# Patient Record
Sex: FEMALE | Race: WHITE | URBAN - METROPOLITAN AREA
[De-identification: names, ages, dates, MRNs, and addresses within clinical notes are randomized per-mention and may not be internally consistent; named-entity substitution may affect disease eponyms.]

---

## 2023-03-21 ENCOUNTER — TELEPHONE (OUTPATIENT)
Dept: OBGYN CLINIC | Facility: CLINIC | Age: 33
End: 2023-03-21

## 2023-03-21 NOTE — TELEPHONE ENCOUNTER
LMP 2/8, periods every 28-31 days (5w6d). Ht: 5'6\", Wt:140#. Pt agreed to see all providers. Pt given recs for OBN appt but pt asked if the first appt should be to verify pregnancy. Pt advised that we feel the hpts are accurate. Pt states she would like an appt to verify pregnancy. Pt informed first available NP appt is on 4/17. Pt unhappy that the appt is so far out. Pt asked if she should just go to a clinic for a test.  Pt informed she can if she wants. Pt informed that we will order a pregnancy test with her npn labs. Pt then agreed to schedule obn appt. Pt offered appt on 4/10, when pt is 8w5d. Pt again was unhappy that this appt was so far away. PT states she will already be 2 months pregnant and she feels like she should be seen sooner. Pt informed that prior to 8-9 weeks it is harder to see on US and hear FHT so we do not bring pt's in sooner than that. Pt informed there is not much to do before 8 weeks. Pt asked if this is the policy everywhere or just our office. Pt informed I am unsure what other offices do. Pt given phone number to [facility]. Pt states she will call back is she wants to continue care with us.

## 2023-03-22 ENCOUNTER — TELEPHONE (OUTPATIENT)
Dept: OBGYN CLINIC | Facility: CLINIC | Age: 33
End: 2023-03-22

## 2023-03-22 NOTE — TELEPHONE ENCOUNTER
Pt reports pump inside vagina that is bigger than pimple. Only painful if she touches it. Denies drainage. Pt has not been seen before. Informed pt we do not have any available appts this week or next week. Informed pt she should be seen and can call her PCP or go to . Pt then states she is pregnant and wants to schedule an appt. Informed pt next OBn is on 4/13. (see 3/21/23 TE). Pt states she will call a different group.

## 2023-03-22 NOTE — TELEPHONE ENCOUNTER
Patient is 8 weeks pregnant, first day of Last period is 2/8/23. Patient is wanting to come in as soon as possible due to a cyst she noticed in Vagina.

## 2024-10-27 ENCOUNTER — HOSPITAL ENCOUNTER (EMERGENCY)
Facility: HOSPITAL | Age: 34
Discharge: HOME | End: 2024-10-27
Payer: COMMERCIAL

## 2024-10-27 ENCOUNTER — APPOINTMENT (OUTPATIENT)
Dept: ULTRASOUND IMAGING | Facility: HOSPITAL | Age: 34
End: 2024-10-27
Attending: NURSE PRACTITIONER
Payer: COMMERCIAL

## 2024-10-27 ENCOUNTER — APPOINTMENT (OUTPATIENT)
Dept: CT IMAGING | Facility: HOSPITAL | Age: 34
End: 2024-10-27
Attending: NURSE PRACTITIONER
Payer: COMMERCIAL

## 2024-10-27 ENCOUNTER — HOSPITAL ENCOUNTER (EMERGENCY)
Age: 34
Discharge: LEFT BEFORE DISCHARGE | End: 2024-10-27
Attending: STUDENT IN AN ORGANIZED HEALTH CARE EDUCATION/TRAINING PROGRAM

## 2024-10-27 VITALS
RESPIRATION RATE: 18 BRPM | BODY MASS INDEX: 22.5 KG/M2 | DIASTOLIC BLOOD PRESSURE: 74 MMHG | WEIGHT: 140 LBS | TEMPERATURE: 98 F | SYSTOLIC BLOOD PRESSURE: 112 MMHG | OXYGEN SATURATION: 99 % | HEIGHT: 66 IN | HEART RATE: 78 BPM

## 2024-10-27 VITALS
TEMPERATURE: 97.9 F | SYSTOLIC BLOOD PRESSURE: 121 MMHG | OXYGEN SATURATION: 97 % | HEART RATE: 106 BPM | RESPIRATION RATE: 19 BRPM | DIASTOLIC BLOOD PRESSURE: 78 MMHG

## 2024-10-27 DIAGNOSIS — R10.9 ABDOMINAL PAIN OF UNKNOWN ETIOLOGY: ICD-10-CM

## 2024-10-27 DIAGNOSIS — R10.2 PELVIC PAIN: Primary | ICD-10-CM

## 2024-10-27 LAB
ANION GAP SERPL CALC-SCNC: 7 MMOL/L (ref 0–18)
B-HCG UR QL: NEGATIVE
BASOPHILS # BLD AUTO: 0.05 X10(3) UL (ref 0–0.2)
BASOPHILS NFR BLD AUTO: 0.6 %
BILIRUB UR QL: NEGATIVE
BUN BLD-MCNC: 15 MG/DL (ref 9–23)
BUN/CREAT SERPL: 15.2 (ref 10–20)
CALCIUM BLD-MCNC: 9.5 MG/DL (ref 8.7–10.4)
CHLORIDE SERPL-SCNC: 106 MMOL/L (ref 98–112)
CLARITY UR: CLEAR
CO2 SERPL-SCNC: 28 MMOL/L (ref 21–32)
CREAT BLD-MCNC: 0.99 MG/DL
DEPRECATED RDW RBC AUTO: 42.1 FL (ref 35.1–46.3)
EGFRCR SERPLBLD CKD-EPI 2021: 78 ML/MIN/1.73M2 (ref 60–?)
EOSINOPHIL # BLD AUTO: 0.15 X10(3) UL (ref 0–0.7)
EOSINOPHIL NFR BLD AUTO: 1.8 %
ERYTHROCYTE [DISTWIDTH] IN BLOOD BY AUTOMATED COUNT: 13.2 % (ref 11–15)
GLUCOSE BLD-MCNC: 97 MG/DL (ref 70–99)
GLUCOSE UR-MCNC: NORMAL MG/DL
HCT VFR BLD AUTO: 39.5 %
HGB BLD-MCNC: 12.9 G/DL
HGB UR QL STRIP.AUTO: NEGATIVE
IMM GRANULOCYTES # BLD AUTO: 0.02 X10(3) UL (ref 0–1)
IMM GRANULOCYTES NFR BLD: 0.2 %
KETONES UR-MCNC: NEGATIVE MG/DL
LEUKOCYTE ESTERASE UR QL STRIP.AUTO: NEGATIVE
LYMPHOCYTES # BLD AUTO: 1.19 X10(3) UL (ref 1–4)
LYMPHOCYTES NFR BLD AUTO: 14.6 %
MCH RBC QN AUTO: 28.4 PG (ref 26–34)
MCHC RBC AUTO-ENTMCNC: 32.7 G/DL (ref 31–37)
MCV RBC AUTO: 87 FL
MONOCYTES # BLD AUTO: 0.44 X10(3) UL (ref 0.1–1)
MONOCYTES NFR BLD AUTO: 5.4 %
NEUTROPHILS # BLD AUTO: 6.31 X10 (3) UL (ref 1.5–7.7)
NEUTROPHILS # BLD AUTO: 6.31 X10(3) UL (ref 1.5–7.7)
NEUTROPHILS NFR BLD AUTO: 77.4 %
NITRITE UR QL STRIP.AUTO: NEGATIVE
OSMOLALITY SERPL CALC.SUM OF ELEC: 293 MOSM/KG (ref 275–295)
PH UR: 6.5 [PH] (ref 5–8)
PLATELET # BLD AUTO: 275 10(3)UL (ref 150–450)
POTASSIUM SERPL-SCNC: 4.1 MMOL/L (ref 3.5–5.1)
PROT UR-MCNC: NEGATIVE MG/DL
RBC # BLD AUTO: 4.54 X10(6)UL
SODIUM SERPL-SCNC: 141 MMOL/L (ref 136–145)
SP GR UR STRIP: 1.01 (ref 1–1.03)
UROBILINOGEN UR STRIP-ACNC: NORMAL
WBC # BLD AUTO: 8.2 X10(3) UL (ref 4–11)

## 2024-10-27 PROCEDURE — 99284 EMERGENCY DEPT VISIT MOD MDM: CPT

## 2024-10-27 PROCEDURE — 80048 BASIC METABOLIC PNL TOTAL CA: CPT

## 2024-10-27 PROCEDURE — 85025 COMPLETE CBC W/AUTO DIFF WBC: CPT

## 2024-10-27 PROCEDURE — 74177 CT ABD & PELVIS W/CONTRAST: CPT | Performed by: NURSE PRACTITIONER

## 2024-10-27 PROCEDURE — 76830 TRANSVAGINAL US NON-OB: CPT | Performed by: NURSE PRACTITIONER

## 2024-10-27 PROCEDURE — 81025 URINE PREGNANCY TEST: CPT

## 2024-10-27 PROCEDURE — 76856 US EXAM PELVIC COMPLETE: CPT | Performed by: NURSE PRACTITIONER

## 2024-10-27 PROCEDURE — 81003 URINALYSIS AUTO W/O SCOPE: CPT

## 2024-10-27 PROCEDURE — 36415 COLL VENOUS BLD VENIPUNCTURE: CPT

## 2024-10-27 NOTE — ED INITIAL ASSESSMENT (HPI)
S: pt with rlq for a month, today it became worse prompting ER visit. Denies fevers, vomiting or diarrhea.

## 2024-10-27 NOTE — ED PROVIDER NOTES
Patient Seen in: [facility] Emergency Department      History     Chief Complaint   Patient presents with    Abdomen/Flank Pain     Stated Complaint: Abd pain,Nausea    Subjective:   34-year-old female with no significant past medical history presenting with complaints of pain to the right lower abdomen/pelvic region for the past month.  Pain is described as a stabbing pain which has been constant since the onset with episodic reservations.  She denies any known triggering events.  She denies any fevers, chills, nausea, vomiting, diarrhea, urinary symptoms, rashes, vaginal bleeding, or further associated symptoms.  She was recently treated for H. pylori 2 months ago with antibiotics and again for lung infection with antibiotics.  Of note, she has been under increased stress as her  was recently diagnosed with lymphoma 1 week after she delivered her baby 11 months ago.              Objective:     History reviewed. No pertinent past medical history.           History reviewed. No pertinent surgical history.             Social History     Socioeconomic History    Marital status:    Tobacco Use    Smoking status: Never    Smokeless tobacco: Never   Vaping Use    Vaping status: Never Used   Substance and Sexual Activity    Alcohol use: Never    Drug use: Never     Social Drivers of Health     Financial Resource Strain: Low Risk  (11/21/2023)    Received from [facility]    Overall Financial Resource Strain (CARDIA)     Difficulty of Paying Living Expenses: Not very hard   Food Insecurity: No Food Insecurity (11/21/2023)    Received from [facility]    Hunger Vital Sign     Worried About Running Out of Food in the Last Year: Never true     Ran Out of Food in the Last Year: Never true   Transportation Needs: No Transportation Needs (11/21/2023)    Received from [facility]    PRAPARE - Transportation     Lack of Transportation (Medical): No      Lack of Transportation (Non-Medical): No   Physical Activity: Sufficiently Active (11/21/2023)    Received from [facility]    Exercise Vital Sign     Days of Exercise per Week: 5 days     Minutes of Exercise per Session: 60 min   Stress: Patient Declined (11/21/2023)    Received from [facility]    [employer] [place] [employer] - Occupational Stress Questionnaire     Feeling of Stress : Patient declined   Social Connections: Moderately Isolated (11/21/2023)    Received from [facility]    Social Connection and Isolation Panel [NHANES]     Frequency of Communication with Friends and Family: More than three times a week     Frequency of Social Gatherings with Friends and Family: Once a week     Attends Taoism Services: Never     Active Member of Clubs or Organizations: No     Attends Club or Organization Meetings: Never     Marital Status:    Housing Stability: Low Risk  (11/21/2023)    Received from [facility]    Housing Stability Vital Sign     Unable to Pay for Housing in the Last Year: No     Number of Places Lived in the Last Year: 1     Unstable Housing in the Last Year: No                  Physical Exam     ED Triage Vitals   BP 10/27/24 1509 116/72   Pulse 10/27/24 1509 91   Resp 10/27/24 1509 20   Temp 10/27/24 1509 98.2 °F (36.8 °C)   Temp src 10/27/24 1509 Oral   SpO2 10/27/24 1509 99 %   O2 Device 10/27/24 1958 None (Room air)       Current Vitals:   Vital Signs  BP: 112/74  Pulse: 78  Resp: 18  Temp: 98.2 °F (36.8 °C)  Temp src: Oral  MAP (mmHg): 88    Oxygen Therapy  SpO2: 99 %  O2 Device: None (Room air)        Physical Exam  Vitals and nursing note reviewed.   Constitutional:       Appearance: Normal appearance.   HENT:      Head: Normocephalic.      Right Ear: External ear normal.      Left Ear: External ear normal.      Nose: Nose normal.      Mouth/Throat:      Mouth: Mucous membranes are moist.    Eyes:      Extraocular Movements: Extraocular movements intact.      Conjunctiva/sclera: Conjunctivae normal.      Pupils: Pupils are equal, round, and reactive to light.   Cardiovascular:      Rate and Rhythm: Normal rate and regular rhythm.   Pulmonary:      Effort: Pulmonary effort is normal.      Breath sounds: Normal breath sounds.   Abdominal:      Palpations: Abdomen is soft.      Tenderness: There is abdominal tenderness (Right pelvic region without rebound or guarding). There is no right CVA tenderness, left CVA tenderness, guarding or rebound.      Hernia: No hernia is present.   Musculoskeletal:         General: Normal range of motion.      Cervical back: Normal range of motion.   Skin:     General: Skin is warm and dry.   Neurological:      Mental Status: She is alert and oriented to person, place, and time.   Psychiatric:         Mood and Affect: Mood normal.         Behavior: Behavior normal.             ED Course     Labs Reviewed   BASIC METABOLIC PANEL (8) - Normal   POCT PREGNANCY URINE - Normal   URINALYSIS WITH CULTURE REFLEX   CBC WITH DIFFERENTIAL WITH PLATELET   RAINBOW DRAW LAVENDER   RAINBOW DRAW LIGHT GREEN   RAINBOW DRAW BLUE   RAINBOW DRAW GOLD     CT ABDOMEN+PELVIS(CONTRAST ONLY)(CPT=74177)    Result Date: 10/27/2024  PROCEDURE: CT ABDOMEN + PELVIS (CONTRAST ONLY) (CPT=74177)  COMPARISON: [facility],  PELVIS TRANSABDOMINAL AND TRANSVAGINAL (CPT=76856/46201), 10/27/2024, 5:05 PM.  INDICATIONS: Right lower quadrant abdominal pain with nausea.  TECHNIQUE: Multidetector CT images of the abdomen and pelvis were obtained with non-ionic intravenous contrast material. Automated exposure control for dose reduction was used. Adjustment of the mA and/or kV was done based on the patient's size. Iterative reconstruction technique for dose reduction was employed. Dose information was transmitted to the ACR (American College of Radiology) NRDR (National Radiology Data Registry),  which includes the Dose Index Registry. Oral contrast was not ingested.  FINDINGS: LUNG BASES: The heart is normal in size. There is granulomatous calcification of the posteromedial left lower lobe. There is dependent subsegmental atelectasis bilaterally. LIVER: The liver is enlarged, measuring 19.4 cm. A subcentimeter hypoattenuating hepatic lesion is identified, too small fully characterize.  BILIARY: The gallbladder is present. PANCREAS: No lesion, fluid collection, ductal dilatation, or atrophy.  SPLEEN: No enlargement. A small splenule is seen in the left upper quadrant.  ADRENALS:   No defined mass or abnormal enlargement.  KIDNEYS:   Symmetric enhancement is seen without evidence of hydronephrosis or underlying solid masses. GI/MESENTERY:  There is no evidence of bowel obstruction. A normal caliber appendix is seen without inflammatory manifestations. A heavy stool burden is demonstrated throughout the majority of the colon.   URINARY BLADDER: Incompletely distended without visible calculus. Mild circumferential bladder wall thickening may relate to underdistention. PELVIC NODES: No lymphadenopathy.   PELVIC ORGANS: The uterus is present. The posterior aspect of the fundus, there is a subserosal fibroid. Deep pelvic calcifications likely represent phleboliths.  VASCULATURE:   No aneurysm is detected. RETROPERITONEUM: No mass or lymphadenopathy is apparent.  BONES:   Partial transitional lumbosacral anatomy is noted with lumbarization of the S1 vertebral body. Well-formed pseudoarthroses are present between the transverse processes of the transitional segment and the sacral ala bilaterally. A rudimentary disc is appreciated between the transitional body and the remainder of the sacral spine. Minimal multilevel degenerative the spine are appreciated. ABDOMINAL WALL: Nonspecific periumbilical ovoid nodularity is perceived. OTHER: No free air or fluid is seen in the abdomen or pelvis.           CONCLUSION:  1. No  acute intra-abdominal process is identified. The etiology of the patient's symptoms is unclear from this study.  2. Mild hepatomegaly.  3. Small subserosal uterine fibroid.  4. Lesser incidental findings as above.    Dictated by (CST): Dilip Ramos MD on 10/27/2024 at 8:07 PM     Finalized by (CST): Dilip Ramos MD on 10/27/2024 at 8:11 PM          US PELVIS (TRANSABDOMINAL AND TRANSVAGINAL) (CPT=76856/12344)    Result Date: 10/27/2024  PROCEDURE: US PELVIS TRANSABDOMINAL AND TRANSVAGINAL (CPT=76856/06641)  COMPARISON: None available.  INDICATIONS: Generalized abdominal pain with nausea.  TECHNIQUE: Pelvic ultrasound using transabdominal and transvaginal technique.  A transvaginal scan was performed for improved tissue characterization of the uterus, enhanced visualization of the endometrial canal, and ovarian/adnexal evaluation.  FINDINGS:   UTERUS:    SIZE:  The uterus measures 8.9 x 4.9 x 5.5 cm. ENDOMETRIUM: Endometrial thickness measures up to 1.1 cm. No fluid or mass in the endometrial canal.  MYOMETRIUM: Somewhat heterogeneous echogenicity is seen. At the posterior uterine body, there is an exophytic subserosal probable fibroid measuring 1.5 x 1.6 x 1.5 cm.  OVARIES AND ADNEXA:  RIGHT:   The right ovary measures 3.1 x 2.6 x 2.0 cm and has a sonographically unremarkable appearance. LEFT:   The left ovary measures 3.4 x 1.8 x 2.0 cm and demonstrates a grossly unremarkable sonographic appearance.  CUL-DE-SAC:   No free fluid or mass.  OTHER: Limited sonographic views of the bladder are grossly unremarkable.          CONCLUSION:  1. Sonographically unremarkable appearance of the ovaries.  2. A subserosal uterine fibroid is noted.  3. Lesser incidental findings as above.    Dictated by (CST): Dilip Ramos MD on 10/27/2024 at 6:04 PM     Finalized by (CST): Dilip Ramos MD on 10/27/2024 at 6:06 PM         CT of the abdomen pelvis was obtained and without any acute findings.              MDM               Medical Decision Making  Differentials include ovarian cysts versus IBS/IBD versus strain versus other.  Labs overall reassuring.  Pelvic ultrasound was obtained and without acute findings.  We did discuss obtaining further imaging in ED as etiology of pain not entirely clear.  CT of the abdomen pelvis obtained as well and without any acute findings.  At this time, etiology of her symptoms is not entirely clear.  Advised to follow-up with her OB/GYN or primary care doctor for further evaluation and management.  Patient and her family verbalized understanding and agreement plan.    Amount and/or Complexity of Data Reviewed  Labs: ordered. Decision-making details documented in ED Course.     Details: CBC, urinalysis, BMP normal.  Urine pregnancy negative.  Radiology: ordered. Decision-making details documented in ED Course.    Risk  OTC drugs.        Disposition and Plan     Clinical Impression:  1. Pelvic pain    2. Abdominal pain of unknown etiology         Disposition:  Discharge  10/27/2024  8:15 pm    Follow-up:  No follow-up provider specified.        Medications Prescribed:  There are no discharge medications for this patient.          Supplementary Documentation:

## 2024-10-28 NOTE — DISCHARGE INSTRUCTIONS
Positive fever pain is not highly clear at this time  Please follow-up with your primary care doctor this week to discuss next steps  Your ultrasound as well as CT did not show any concerning findings  Your labs today are also are all normal